# Patient Record
Sex: FEMALE | Race: WHITE | NOT HISPANIC OR LATINO | Employment: FULL TIME | URBAN - METROPOLITAN AREA
[De-identification: names, ages, dates, MRNs, and addresses within clinical notes are randomized per-mention and may not be internally consistent; named-entity substitution may affect disease eponyms.]

---

## 2022-02-26 ENCOUNTER — APPOINTMENT (OUTPATIENT)
Dept: GENERAL RADIOLOGY | Facility: CLINIC | Age: 47
End: 2022-02-26
Attending: PHYSICIAN ASSISTANT
Payer: COMMERCIAL

## 2022-02-26 ENCOUNTER — HOSPITAL ENCOUNTER (EMERGENCY)
Facility: CLINIC | Age: 47
Discharge: HOME | End: 2022-02-26
Attending: PHYSICIAN ASSISTANT | Admitting: PHYSICIAN ASSISTANT
Payer: COMMERCIAL

## 2022-02-26 ENCOUNTER — APPOINTMENT (OUTPATIENT)
Dept: GENERAL RADIOLOGY | Facility: CLINIC | Age: 47
End: 2022-02-26
Attending: EMERGENCY MEDICINE
Payer: COMMERCIAL

## 2022-02-26 VITALS
RESPIRATION RATE: 18 BRPM | HEART RATE: 74 BPM | SYSTOLIC BLOOD PRESSURE: 119 MMHG | TEMPERATURE: 98.1 F | DIASTOLIC BLOOD PRESSURE: 76 MMHG | OXYGEN SATURATION: 98 %

## 2022-02-26 DIAGNOSIS — S52.501A CLOSED FRACTURE OF DISTAL END OF RIGHT RADIUS, UNSPECIFIED FRACTURE MORPHOLOGY, INITIAL ENCOUNTER: ICD-10-CM

## 2022-02-26 PROCEDURE — 96374 THER/PROPH/DIAG INJ IV PUSH: CPT | Mod: 59

## 2022-02-26 PROCEDURE — 25605 CLTX DST RDL FX/EPHYS SEP W/: CPT | Mod: RT

## 2022-02-26 PROCEDURE — 250N000011 HC RX IP 250 OP 636: Performed by: EMERGENCY MEDICINE

## 2022-02-26 PROCEDURE — 96375 TX/PRO/DX INJ NEW DRUG ADDON: CPT | Mod: 59

## 2022-02-26 PROCEDURE — 999N000065 XR WRIST RIGHT 2 VIEW: Mod: RT

## 2022-02-26 PROCEDURE — 250N000013 HC RX MED GY IP 250 OP 250 PS 637: Performed by: PHYSICIAN ASSISTANT

## 2022-02-26 PROCEDURE — 99285 EMERGENCY DEPT VISIT HI MDM: CPT | Mod: 25

## 2022-02-26 PROCEDURE — 73110 X-RAY EXAM OF WRIST: CPT | Mod: RT

## 2022-02-26 PROCEDURE — 73090 X-RAY EXAM OF FOREARM: CPT | Mod: RT

## 2022-02-26 RX ORDER — KETOROLAC TROMETHAMINE 15 MG/ML
15 INJECTION, SOLUTION INTRAMUSCULAR; INTRAVENOUS ONCE
Status: COMPLETED | OUTPATIENT
Start: 2022-02-26 | End: 2022-02-26

## 2022-02-26 RX ORDER — ACETAMINOPHEN 325 MG/1
975 TABLET ORAL ONCE
Status: COMPLETED | OUTPATIENT
Start: 2022-02-26 | End: 2022-02-26

## 2022-02-26 RX ORDER — MORPHINE SULFATE 4 MG/ML
4 INJECTION, SOLUTION INTRAMUSCULAR; INTRAVENOUS
Status: COMPLETED | OUTPATIENT
Start: 2022-02-26 | End: 2022-02-26

## 2022-02-26 RX ORDER — OXYCODONE AND ACETAMINOPHEN 5; 325 MG/1; MG/1
1 TABLET ORAL EVERY 6 HOURS PRN
Qty: 12 TABLET | Refills: 0 | Status: ON HOLD | OUTPATIENT
Start: 2022-02-26 | End: 2022-03-10

## 2022-02-26 RX ORDER — BUPIVACAINE HYDROCHLORIDE 5 MG/ML
INJECTION, SOLUTION PERINEURAL
Status: DISCONTINUED
Start: 2022-02-26 | End: 2022-02-26 | Stop reason: HOSPADM

## 2022-02-26 RX ADMIN — KETOROLAC TROMETHAMINE 15 MG: 15 INJECTION, SOLUTION INTRAMUSCULAR; INTRAVENOUS at 19:03

## 2022-02-26 RX ADMIN — MORPHINE SULFATE 4 MG: 4 INJECTION INTRAVENOUS at 19:01

## 2022-02-26 RX ADMIN — ACETAMINOPHEN 975 MG: 325 TABLET, FILM COATED ORAL at 16:46

## 2022-02-26 ASSESSMENT — ENCOUNTER SYMPTOMS
NUMBNESS: 1
ARTHRALGIAS: 1

## 2022-02-26 NOTE — ED PROVIDER NOTES
History   Chief Complaint:  Arm Pain       HPI   Liset Velez is a left-handed 46 year old female, otherwise healthy, who presents with right arm injury. About an hour prior to her ED arrival, the patient was snowboarding at [facility] when she fell backwards onto her outstretched right hand. Since then, she has developed pain and swelling to her right wrist and forearm. Additionally, she is having some numbness and tingling in all of her right fingers. She has not taken anything for pain at this point. She denies hitting her head or experiencing any loss of consciousness. She was wearing a helmet at the time of injury.     Review of Systems   Musculoskeletal: Positive for arthralgias (right wrist).   Neurological: Positive for numbness.   All other systems reviewed and are negative.      Allergies:  The patient has no known allergies.     Medications:  The patient is currently on no regular medications.    Past Medical History:     The patient has no pertinent medical history.    Past Surgical History:    The patient has no pertinent surgical history.     Family History:    The patient has no known family history.    Social History:  The patient presents to the ED with her . Today was her third time snowboarding.       Physical Exam     Patient Vitals for the past 24 hrs:   BP Temp Temp src Pulse Resp SpO2   02/26/22 1920 -- -- -- 78 -- 97 %   02/26/22 1910 -- -- -- 86 -- 97 %   02/26/22 1612 119/76 98.1  F (36.7  C) Temporal 87 18 98 %       Physical Exam  HEENT: mmm  Eyes: PERRL B/L  Neck: Supple  CV: Peripheral pulses intact and regular  Resp: Speaking in full sentences without any respiratory distress  Ext:  Right upper extremity  Deformity noted to right wrist with associated TTP  Decreased ROM at wrist secondary to pain, decreased ROM of fingers as well  <2 sec cap refill throughout.  Sensation intact throughout all fingers.  Remainder of the skeletal survey is unremarkable  Skin: warm dry well  perfused  Neuro: Alert  Nursing notes and vital signs reviewed.      Emergency Department Course     Imaging:  XR Wrist Right G/E 3 Views   Final Result   IMPRESSION:    Wrist: Comminuted fracture distal radial metaphysis likely extending to the distal articular surface medially and dorsally. There is dorsal displacement of the distal fragment by 1 cm and impaction dorsally resulting in apex volar angulation. Soft tissue    swelling.      Forearm: No additional fracture.      Radius/Ulna XR, PA & LAT, right   Final Result   IMPRESSION:    Wrist: Comminuted fracture distal radial metaphysis likely extending to the distal articular surface medially and dorsally. There is dorsal displacement of the distal fragment by 1 cm and impaction dorsally resulting in apex volar angulation. Soft tissue    swelling.      Forearm: No additional fracture.        Report per radiology    Emergency Department Course:    Reviewed:  I reviewed nursing notes and vitals    Assessments:  1630 I obtained history and examined the patient as noted above.    I rechecked the patient and explained findings.     Consults:  1817 I spoke with my colleague Dr. Guillory who agreed to share service.     Interventions:  1646 Tylenol 975 mg PO    Disposition:  The patient was discharged to home.     Impression & Plan     Medical Decision Making:  Liset Velez is a 46 year old female who presents to the ED for evaluation after a fall while snowboarding.  This was a FOOSH.  She denies hitting her head, no LOC.  No pain to other extremities.  Patient has obvious deformity to right wrist.  See HPI as above for additional details.  Vitals and physical exam as above.  Imaging obtained as above suggestive fracture of distal radial metaphysis with displacement. See results as above. Patient signed out to Dr. Guillory for reduction. Will provide Percocet Rx for home, and she will f/u with orthopedics at home. Patient stable at time of sign out.     Diagnosis:     ICD-10-CM    1. Closed fracture of distal end of right radius, unspecified fracture morphology, initial encounter  S52.501A        Discharge Medications:  New Prescriptions    No medications on file       Scribe Disclosure:  I, Pearl Adame, am serving as a scribe on 2/26/2022 at 4:27 PM to personally document services performed by Lefty Quesada PA-C based on my observations and the provider's statements to me.     This record was created at least in part using electronic voice recognition software, so please excuse any typographical errors.         Lefty Quesada PA-C  02/26/22 1957

## 2022-02-26 NOTE — ED TRIAGE NOTES
Pt arrives with c/o right wrist pain s/p falling backwards while snowboarding. Pt has limited movement in fingers, sensation is intact. ABCs intact.

## 2022-02-26 NOTE — LETTER
February 26, 2022      To Whom It May Concern:      Liset Velez was seen in our Emergency Department today, 02/26/22.  She has a right wrist fracture.  Please excuse her from work for the next 7 days.    Sincerely,        Deyvi Guillory MD

## 2022-02-27 NOTE — DISCHARGE INSTRUCTIONS
Take ibuprofen 600mg every 6 hours for pain. You may also take Tylenol, but be careful to not take more than 3000mg of Tylenol in a day, and the prescribed Percocet contains Tylenol as well.    Use Percocet for breakthrough pain. This is sedating. Do not drive after taking.         Discharge Instructions  Splint Care    You had a splint put on today to help protect your injury and help it heal.  Splints are used to treat things like strains, sprains, large cuts, and fractures (broken bones). Splints are temporary and are designed to allow for swelling.    Be sure your splint is not too tight!  If you splint is too tight, it may cause loss of blood supply.  Signs of your splint being too tight include: your arm or leg hurting a lot more; your fingers or toes getting numb, cold, pale or blue; or your child is crying, fussing or seeming restless.    Generally, every Emergency Department visit should have a follow-up clinic visit with either a primary or a specialty clinic/provider. Please follow-up as instructed by your emergency provider today.  Return to the Emergency Department right away if:  You have increased pain or pressure around the injury.  You have numbness, tingling, or cool, pale, or blue toes or fingers past the injury.  Your child is more fussy than normal, crying a lot, or restless.  Your splint becomes soft, breaks, or is wet.  Your splint begins to smell bad.  Your splint is cutting into your skin.    Home care:  Keep the injured area above the level of your heart while laying or sitting down.  This will help decrease the swelling and the pain.  Keep the splint dry.  Do not put objects down or inside the splint.  If there is an elastic bandage (Ace  wrap) holding the splint on this may be loosened slightly to relieve pressure or pain.  If pain continues return to the Emergency Department right away.  Do not remove your splint by yourself unless told to by your provider.    Follow-up:  Sometimes the  splint put on in the Emergency Department needs to be changed once the swelling has gone down and a more permanent cast needs to be placed.  This is usually done by a bone specialist provider (Orthopedist).  Follow the instructions given to you by your provider today.    If you were given a prescription for medicine here today, be sure to read all of the information (including the package insert) that comes with your prescription.  This will include important information about the medicine, its side effects, and any warnings that you need to know about.  The pharmacist who fills the prescription can provide more information and answer questions you may have about the medicine.  If you have questions or concerns that the pharmacist cannot address, please call or return to the Emergency Department.     Remember that you can always come back to the Emergency Department if you are not able to see your regular provider in the amount of time listed above, if you get any new symptoms, or if there is anything that worries you.

## 2022-02-27 NOTE — ED PROVIDER NOTES
Emergency Department Attending Supervision Note  2/26/2022  6:17 PM      I evaluated this patient in conjunction with Lefty Quesada PA-C , please see primary note for full details      Briefly, the patient presented with right wrist pain onset earlier today at after fall while snowboarding.  Patient denies loss of consciousness, chest pain, shortness of breath, abdominal pain.  She does report associated numbness and tingling to her fingers      On my exam, nontoxic appearance, normal respiratory effort, obvious deformity to right wrist with distal CMS intact although range of motion limited due to deformity and pain    Results:  XR Wrist Right G/E 3 Views   Final Result   IMPRESSION:    Wrist: Comminuted fracture distal radial metaphysis likely extending to the distal articular surface medially and dorsally. There is dorsal displacement of the distal fragment by 1 cm and impaction dorsally resulting in apex volar angulation. Soft tissue    swelling.      Forearm: No additional fracture.      Radius/Ulna XR, PA & LAT, right   Final Result   IMPRESSION:    Wrist: Comminuted fracture distal radial metaphysis likely extending to the distal articular surface medially and dorsally. There is dorsal displacement of the distal fragment by 1 cm and impaction dorsally resulting in apex volar angulation. Soft tissue    swelling.      Forearm: No additional fracture.      XR Wrist Right 2 Views    (Results Pending)     [facility]    -Fracture    Date/Time: 2/26/2022 8:44 PM  Performed by: Deyvi Guillory MD  Authorized by: Deyvi Guillory MD     Risks, benefits and alternatives discussed.      INJURY      Injury location:  Wrist    Wrist injury location:  R wrist    Wrist fracture type comment:  Colle's fracture of distal radius    PRE PROCEDURE ASSESSMENT      Neurological function: normal      Distal perfusion: normal      Range of motion: reduced      ANESTHESIA (see MAR for exact  dosages)      Anesthesia method:  Local infiltration (Hematoma block using 10 mL)    Local anesthetic:  Bupivacaine 0.5% w/o epi    PROCEDURE DETAILS:     Manipulation performed: yes      Skin traction used: yes (finger traps)      Skeletal traction used: no      Pin inserted: no      Reduction successful: yes      X-ray confirmed reduction: yes      Immobilization:  Splint    Splint type:  Sugar tong    Supplies used:  Ortho-Glass    POST PROCEDURE ASSESSMENT      Neurological function: normal      Distal perfusion: normal      Range of motion: improved        PROCEDURE    Patient Tolerance:  Patient tolerated the procedure well with no immediate complications      ED course:    46 year old female presenting w/ R wrist pain and obvious deformity.  X-rays reveal a fracture as described above.  Reduction performed as noted above with finger traps after hematoma block was placed.  X-rays revealed improved alignment.   At this time I feel the patient is safe for discharge.  RICE instructions given for home Recommendations given regarding follow up with Orthopedics and return to the emergency department as needed for new or worsening symptoms.  Patient counseled on all results, diagnosis and disposition.  They are understanding and agreeable to plan. Patient discharged in stable condition.      Diagnosis    ICD-10-CM    1. Closed fracture of distal end of right radius, unspecified fracture morphology, initial encounter  S52.501A          MD Kahlil Clements Christopher E, MD  02/26/22 1584

## 2022-03-03 ENCOUNTER — OFFICE VISIT (OUTPATIENT)
Dept: ORTHOPEDICS | Facility: CLINIC | Age: 47
End: 2022-03-03
Payer: COMMERCIAL

## 2022-03-03 ENCOUNTER — PREP FOR PROCEDURE (OUTPATIENT)
Dept: ORTHOPEDICS | Facility: CLINIC | Age: 47
End: 2022-03-03

## 2022-03-03 ENCOUNTER — TELEPHONE (OUTPATIENT)
Dept: ORTHOPEDICS | Facility: CLINIC | Age: 47
End: 2022-03-03

## 2022-03-03 VITALS — WEIGHT: 132 LBS | DIASTOLIC BLOOD PRESSURE: 74 MMHG | SYSTOLIC BLOOD PRESSURE: 126 MMHG

## 2022-03-03 DIAGNOSIS — Z11.59 ENCOUNTER FOR SCREENING FOR OTHER VIRAL DISEASES: Primary | ICD-10-CM

## 2022-03-03 DIAGNOSIS — S52.531A CLOSED COLLES' FRACTURE OF RIGHT RADIUS, INITIAL ENCOUNTER: Primary | ICD-10-CM

## 2022-03-03 PROCEDURE — 99203 OFFICE O/P NEW LOW 30 MIN: CPT | Performed by: ORTHOPAEDIC SURGERY

## 2022-03-03 RX ORDER — VARENICLINE TARTRATE 1 MG/1
1 TABLET, FILM COATED ORAL
COMMUNITY
Start: 2022-02-21

## 2022-03-03 NOTE — PROGRESS NOTES
HISTORY OF PRESENT ILLNESS:    Liset Velez is a 46 year old female who is seen Emergency Room follow up for right distal radius fracture.  Injury occurred on 2/26/22 after a fall while snowboarding. Left hand dominant, and works as nurse.     Present symptoms: right dorsal wrist pain. She reports discomfort at the 4th MCP finger. She reports that   Current pain level: 5/10  Treatments tried to this point: closed reduction in ED, splint application, Percocet at nighttime, Tylenol.  Patient reports she was placed in traction yesterday, and a new reverse sugar tong splint, patient did not tolerate and removed splint last night and reapplied splint that was placed in ED on 2/26/22.   So far, she was initially seen at the emergency room at [facility] on February 26, 2022.  She was then seen on February 28 of 2022 at [facility] and then again yesterday March 2, 2022.  She is right-hand dominant.    Orthopedic PMH: none      No past medical history on file.  History of uterine bleeding      No past surgical history on file.  None significant    No family history on file.  Not contributory    Social History     Socioeconomic History     Marital status: Single     Spouse name: Not on file     Number of children: Not on file     Years of education: Not on file     Highest education level: Not on file   Occupational History     Not on file   Tobacco Use     Smoking status: Not on file     Smokeless tobacco: Not on file   Substance and Sexual Activity     Alcohol use: Not on file     Drug use: Not on file     Sexual activity: Not on file   Other Topics Concern     Not on file   Social History Narrative     Not on file     Social Determinants of Health     Financial Resource Strain: Not on file   Food Insecurity: Not on file   Transportation Needs: Not on file   Physical Activity: Not on file   Stress: Not on file   Social Connections: Not on file   Intimate Partner Violence: Not on file   Housing Stability: Not  on file       Current Outpatient Medications   Medication Sig Dispense Refill     oxyCODONE-acetaminophen (PERCOCET) 5-325 MG tablet Take 1 tablet by mouth every 6 hours as needed for severe pain 12 tablet 0       No Known Allergies    REVIEW OF SYSTEMS:  CONSTITUTIONAL:  NEGATIVE for fever, chills, change in weight  INTEGUMENTARY/SKIN:  NEGATIVE for worrisome rashes, moles or lesions  EYES:  NEGATIVE for vision changes or irritation  ENT/MOUTH:  NEGATIVE for ear, mouth and throat problems  RESP:  NEGATIVE for significant cough or SOB  BREAST:  NEGATIVE for masses, tenderness or discharge  CV:  NEGATIVE for chest pain, palpitations or peripheral edema  GI:  NEGATIVE for nausea, abdominal pain, heartburn, or change in bowel habits  :  Negative   MUSCULOSKELETAL:  See HPI above  NEURO:  NEGATIVE for weakness, dizziness or paresthesias  ENDOCRINE:  NEGATIVE for temperature intolerance, skin/hair changes  HEME/ALLERGY/IMMUNE:  NEGATIVE for bleeding problems  PSYCHIATRIC:  NEGATIVE for changes in mood or affect      PHYSICAL EXAM:  LMP 02/01/2022 (Approximate)   There is no height or weight on file to calculate BMI.   GENERAL APPEARANCE: healthy, alert and no distress   HEENT: No apparent thyroid megaly. Clear sclera with normal ocular movement  RESPIRATORY: No labored breathing  SKIN: no suspicious lesions or rashes  NEURO: Normal strength and tone, mentation intact and speech normal  VASCULAR: Good pulses, and capillary refill   LYMPH: no lymphadenopathy   PSYCH:  mentation appears normal and affect normal/bright    MUSCULOSKELETAL:  Not in acute distress  Mild diffuse swelling of the fingertips exposed outside sugar tong splint  Sensation is grossly intact       ASSESSMENT:  Distal radius fracture with significant displacement and angulation  There appears to be a nondisplaced intra-articular component, right    PLAN:  There were 2 sets of the x-rays of 1 at the time of presentation and after sugar tong splint  application.  There was noticeable improvement of the alignment.  Even with improvement, she does have significant dorsal displacement and tilting.  With her young age of 46, in my opinion, she would be best served with more anatomic restoration of the alignment.    Treatment options are closed duction and casting versus open reduction internal fixation of distal radius were informed.    She understands our discussion and wants to go ahead with surgical intervention.    The nature of the surgery, type of plates that we use for the surgery and potential risks of infection, loss of range of motion, neurovascular compromise and stiffness etc. were thoroughly informed.    This will be done as an outpatient surgery under general anesthesia.        Imaging Interpretation:   None taken today      Jose L Castorena MD  Department of Orthopedic Surgery        Disclaimer: This note consists of symbols derived from keyboarding, dictation and/or voice recognition software. As a result, there may be errors in the script that have gone undetected. Please consider this when interpreting information found in this chart.

## 2022-03-03 NOTE — TELEPHONE ENCOUNTER
Scheduled surgery    No covid test needed, patient tested positive on 1/7/22    Type of surgery: ORIF right wrist  Location of surgery: [facility]  Date and time of surgery: 3/10/22  Surgeon: Kell  Pre-Op Appt Date: patient to schedule  Post-Op Appt Date: 3/21/22   Packet sent out: Yes  Pre-cert/Authorization completed:  No  Date: 3.3.22      Flaco Garza, Surgery Scheduler

## 2022-03-03 NOTE — LETTER
3/3/2022         RE: Liset Velez  [address] [facility] [address]        Dear Colleague,    Thank you for referring your patient, Liset Velez, to the [facility] [place]. Please see a copy of my visit note below.    HISTORY OF PRESENT ILLNESS:    Liset Velez is a 46 year old female who is seen Emergency Room follow up for right distal radius fracture.  Injury occurred on 2/26/22 after a fall while snowboarding. Left hand dominant, and works as nurse.     Present symptoms: right dorsal wrist pain. She reports discomfort at the 4th MCP finger. She reports that   Current pain level: 5/10  Treatments tried to this point: closed reduction in ED, splint application, Percocet at nighttime, Tylenol.  Patient reports she was placed in traction yesterday, and a new reverse sugar tong splint, patient did not tolerate and removed splint last night and reapplied splint that was placed in ED on 2/26/22.   So far, she was initially seen at the emergency room at [facility] on February 26, 2022.  She was then seen on February 28 of 2022 at [facility] and then again yesterday March 2, 2022.  She is right-hand dominant.    Orthopedic PMH: none      No past medical history on file.  History of uterine bleeding      No past surgical history on file.  None significant    No family history on file.  Not contributory    Social History     Socioeconomic History     Marital status: Single     Spouse name: Not on file     Number of children: Not on file     Years of education: Not on file     Highest education level: Not on file   Occupational History     Not on file   Tobacco Use     Smoking status: Not on file     Smokeless tobacco: Not on file   Substance and Sexual Activity     Alcohol use: Not on file     Drug use: Not on file     Sexual activity: Not on file   Other Topics Concern     Not on file   Social History Narrative     Not on file     Social Determinants of Health      Financial Resource Strain: Not on file   Food Insecurity: Not on file   Transportation Needs: Not on file   Physical Activity: Not on file   Stress: Not on file   Social Connections: Not on file   Intimate Partner Violence: Not on file   Housing Stability: Not on file       Current Outpatient Medications   Medication Sig Dispense Refill     oxyCODONE-acetaminophen (PERCOCET) 5-325 MG tablet Take 1 tablet by mouth every 6 hours as needed for severe pain 12 tablet 0       No Known Allergies    REVIEW OF SYSTEMS:  CONSTITUTIONAL:  NEGATIVE for fever, chills, change in weight  INTEGUMENTARY/SKIN:  NEGATIVE for worrisome rashes, moles or lesions  EYES:  NEGATIVE for vision changes or irritation  ENT/MOUTH:  NEGATIVE for ear, mouth and throat problems  RESP:  NEGATIVE for significant cough or SOB  BREAST:  NEGATIVE for masses, tenderness or discharge  CV:  NEGATIVE for chest pain, palpitations or peripheral edema  GI:  NEGATIVE for nausea, abdominal pain, heartburn, or change in bowel habits  :  Negative   MUSCULOSKELETAL:  See HPI above  NEURO:  NEGATIVE for weakness, dizziness or paresthesias  ENDOCRINE:  NEGATIVE for temperature intolerance, skin/hair changes  HEME/ALLERGY/IMMUNE:  NEGATIVE for bleeding problems  PSYCHIATRIC:  NEGATIVE for changes in mood or affect      PHYSICAL EXAM:  LMP 02/01/2022 (Approximate)   There is no height or weight on file to calculate BMI.   GENERAL APPEARANCE: healthy, alert and no distress   HEENT: No apparent thyroid megaly. Clear sclera with normal ocular movement  RESPIRATORY: No labored breathing  SKIN: no suspicious lesions or rashes  NEURO: Normal strength and tone, mentation intact and speech normal  VASCULAR: Good pulses, and capillary refill   LYMPH: no lymphadenopathy   PSYCH:  mentation appears normal and affect normal/bright    MUSCULOSKELETAL:  Not in acute distress  Mild diffuse swelling of the fingertips exposed outside sugar tong splint  Sensation is grossly  intact       ASSESSMENT:  Distal radius fracture with significant displacement and angulation  There appears to be a nondisplaced intra-articular component, right    PLAN:  There were 2 sets of the x-rays of 1 at the time of presentation and after sugar tong splint application.  There was noticeable improvement of the alignment.  Even with improvement, she does have significant dorsal displacement and tilting.  With her young age of 46, in my opinion, she would be best served with more anatomic restoration of the alignment.    Treatment options are closed duction and casting versus open reduction internal fixation of distal radius were informed.    She understands our discussion and wants to go ahead with surgical intervention.    The nature of the surgery, type of plates that we use for the surgery and potential risks of infection, loss of range of motion, neurovascular compromise and stiffness etc. were thoroughly informed.    This will be done as an outpatient surgery under general anesthesia.        Imaging Interpretation:   None taken today      Jose L Castorena MD  Department of Orthopedic Surgery        Disclaimer: This note consists of symbols derived from keyboarding, dictation and/or voice recognition software. As a result, there may be errors in the script that have gone undetected. Please consider this when interpreting information found in this chart.        Again, thank you for allowing me to participate in the care of your patient.        Sincerely,        Jose L Castorena MD

## 2022-03-03 NOTE — PATIENT INSTRUCTIONS
Schedule RIGHT WRIST ORIF surgery.   Kam Sam Scheduler will contact you to assist with scheduling surgery.   You can contact her directly at [phone].   Prior to your scheduled surgery we advise scheduling with your dentist to obtain clearance for surgery, and to complete any recommended dental work prior.     FORMS:   If you are needing any forms completed relating to your upcoming procedure, please send them to our office with a completed Release of Information.   Forms will be completed AFTER your procedure. A letter can be sent to your employer prior to surgery to inform them of your anticipated time off.    Please notify our staff if you would like a letter to do so.   Forms can be faxed directly to our clinic at [phone].     DO NOT BRING FORMS ON THE DATE OF SURGERY.     MEDICATION REFILL:   Please allow 3 business days for completion of medication refills for any surgery related prescription.   Medication refills submitted on Friday, may not be addressed until the following Monday.  You may request a refill via [employer], or by calling our Nurse Triage at [phone], option 3.         Call my office with any questions or concerns, [phone].

## 2022-03-07 ENCOUNTER — TRANSFERRED RECORDS (OUTPATIENT)
Dept: HEALTH INFORMATION MANAGEMENT | Facility: CLINIC | Age: 47
End: 2022-03-07
Payer: COMMERCIAL

## 2022-03-07 RX ORDER — NAPROXEN 500 MG/1
TABLET ORAL
COMMUNITY
Start: 2022-01-20

## 2022-03-07 RX ORDER — OXYCODONE HYDROCHLORIDE 5 MG/1
5 TABLET ORAL
Status: ON HOLD | COMMUNITY
Start: 2022-03-07 | End: 2022-03-10

## 2022-03-10 ENCOUNTER — HOSPITAL ENCOUNTER (OUTPATIENT)
Facility: CLINIC | Age: 47
Discharge: HOME | End: 2022-03-10
Attending: ORTHOPAEDIC SURGERY | Admitting: ORTHOPAEDIC SURGERY
Payer: COMMERCIAL

## 2022-03-10 ENCOUNTER — APPOINTMENT (OUTPATIENT)
Dept: GENERAL RADIOLOGY | Facility: CLINIC | Age: 47
End: 2022-03-10
Attending: ORTHOPAEDIC SURGERY
Payer: COMMERCIAL

## 2022-03-10 ENCOUNTER — ANESTHESIA (OUTPATIENT)
Dept: SURGERY | Facility: CLINIC | Age: 47
End: 2022-03-10
Payer: COMMERCIAL

## 2022-03-10 ENCOUNTER — ANESTHESIA EVENT (OUTPATIENT)
Dept: SURGERY | Facility: CLINIC | Age: 47
End: 2022-03-10
Payer: COMMERCIAL

## 2022-03-10 VITALS
DIASTOLIC BLOOD PRESSURE: 82 MMHG | RESPIRATION RATE: 18 BRPM | SYSTOLIC BLOOD PRESSURE: 119 MMHG | WEIGHT: 134 LBS | TEMPERATURE: 97.1 F | HEIGHT: 65 IN | OXYGEN SATURATION: 100 % | HEART RATE: 76 BPM | BODY MASS INDEX: 22.33 KG/M2

## 2022-03-10 DIAGNOSIS — T40.2X5A CONSTIPATION DUE TO OPIOID THERAPY: ICD-10-CM

## 2022-03-10 DIAGNOSIS — G89.18 POST-OPERATIVE PAIN: Primary | ICD-10-CM

## 2022-03-10 DIAGNOSIS — K59.03 CONSTIPATION DUE TO OPIOID THERAPY: ICD-10-CM

## 2022-03-10 PROCEDURE — 370N000017 HC ANESTHESIA TECHNICAL FEE, PER MIN: Performed by: ORTHOPAEDIC SURGERY

## 2022-03-10 PROCEDURE — 360N000083 HC SURGERY LEVEL 3 W/ FLUORO, PER MIN: Performed by: ORTHOPAEDIC SURGERY

## 2022-03-10 PROCEDURE — 250N000011 HC RX IP 250 OP 636: Performed by: ORTHOPAEDIC SURGERY

## 2022-03-10 PROCEDURE — 250N000011 HC RX IP 250 OP 636: Performed by: NURSE ANESTHETIST, CERTIFIED REGISTERED

## 2022-03-10 PROCEDURE — 710N000012 HC RECOVERY PHASE 2, PER MINUTE: Performed by: ORTHOPAEDIC SURGERY

## 2022-03-10 PROCEDURE — 250N000009 HC RX 250: Performed by: ORTHOPAEDIC SURGERY

## 2022-03-10 PROCEDURE — 258N000003 HC RX IP 258 OP 636: Performed by: ANESTHESIOLOGY

## 2022-03-10 PROCEDURE — 272N000001 HC OR GENERAL SUPPLY STERILE: Performed by: ORTHOPAEDIC SURGERY

## 2022-03-10 PROCEDURE — 250N000009 HC RX 250: Performed by: ANESTHESIOLOGY

## 2022-03-10 PROCEDURE — 250N000009 HC RX 250: Performed by: NURSE ANESTHETIST, CERTIFIED REGISTERED

## 2022-03-10 PROCEDURE — 250N000011 HC RX IP 250 OP 636: Performed by: ANESTHESIOLOGY

## 2022-03-10 PROCEDURE — 999N000141 HC STATISTIC PRE-PROCEDURE NURSING ASSESSMENT: Performed by: ORTHOPAEDIC SURGERY

## 2022-03-10 PROCEDURE — 250N000013 HC RX MED GY IP 250 OP 250 PS 637: Performed by: ANESTHESIOLOGY

## 2022-03-10 PROCEDURE — C1713 ANCHOR/SCREW BN/BN,TIS/BN: HCPCS | Performed by: ORTHOPAEDIC SURGERY

## 2022-03-10 PROCEDURE — 250N000013 HC RX MED GY IP 250 OP 250 PS 637: Performed by: ORTHOPAEDIC SURGERY

## 2022-03-10 PROCEDURE — 25607 OPTX DST RD XARTC FX/EPI SEP: CPT | Mod: RT | Performed by: ORTHOPAEDIC SURGERY

## 2022-03-10 PROCEDURE — 999N000179 XR SURGERY CARM FLUORO LESS THAN 5 MIN W STILLS: Mod: TC

## 2022-03-10 DEVICE — IMP SCR SYN CORTEX T8 STARDRIVE 2.7X12MM SELF TAP 202.872: Type: IMPLANTABLE DEVICE | Site: WRIST | Status: FUNCTIONAL

## 2022-03-10 DEVICE — IMP SCR SYN CAN T8 STARDRIVE 2.4X20MM VA-LCP ST 02.210.120: Type: IMPLANTABLE DEVICE | Site: WRIST | Status: FUNCTIONAL

## 2022-03-10 DEVICE — IMP SCR SYN CORTEX T8 STARDRIVE 2.7X10MM SELF TAP 202.870: Type: IMPLANTABLE DEVICE | Site: WRIST | Status: FUNCTIONAL

## 2022-03-10 DEVICE — IMPLANTABLE DEVICE: Type: IMPLANTABLE DEVICE | Site: WRIST | Status: FUNCTIONAL

## 2022-03-10 DEVICE — IMP SCR SYN CORTEX T8 STARDRIVE 2.7X14MM SELF TAP 202.874: Type: IMPLANTABLE DEVICE | Site: WRIST | Status: FUNCTIONAL

## 2022-03-10 DEVICE — IMP SCR SYN LCP CORTEX 2.4X18MM SELF TAP 201.768: Type: IMPLANTABLE DEVICE | Site: WRIST | Status: FUNCTIONAL

## 2022-03-10 RX ORDER — BUPIVACAINE HYDROCHLORIDE AND EPINEPHRINE 5; 5 MG/ML; UG/ML
INJECTION, SOLUTION PERINEURAL PRN
Status: DISCONTINUED | OUTPATIENT
Start: 2022-03-10 | End: 2022-03-10

## 2022-03-10 RX ORDER — NALOXONE HYDROCHLORIDE 0.4 MG/ML
0.2 INJECTION, SOLUTION INTRAMUSCULAR; INTRAVENOUS; SUBCUTANEOUS
Status: DISCONTINUED | OUTPATIENT
Start: 2022-03-10 | End: 2022-03-10 | Stop reason: HOSPADM

## 2022-03-10 RX ORDER — ACETAMINOPHEN 325 MG/1
975 TABLET ORAL ONCE
Status: COMPLETED | OUTPATIENT
Start: 2022-03-10 | End: 2022-03-10

## 2022-03-10 RX ORDER — CELECOXIB 200 MG/1
400 CAPSULE ORAL ONCE
Status: DISCONTINUED | OUTPATIENT
Start: 2022-03-10 | End: 2022-03-10

## 2022-03-10 RX ORDER — LIDOCAINE 40 MG/G
CREAM TOPICAL
Status: DISCONTINUED | OUTPATIENT
Start: 2022-03-10 | End: 2022-03-10 | Stop reason: HOSPADM

## 2022-03-10 RX ORDER — NALOXONE HYDROCHLORIDE 0.4 MG/ML
0.4 INJECTION, SOLUTION INTRAMUSCULAR; INTRAVENOUS; SUBCUTANEOUS
Status: DISCONTINUED | OUTPATIENT
Start: 2022-03-10 | End: 2022-03-10 | Stop reason: HOSPADM

## 2022-03-10 RX ORDER — FENTANYL CITRATE 50 UG/ML
25 INJECTION, SOLUTION INTRAMUSCULAR; INTRAVENOUS
Status: DISCONTINUED | OUTPATIENT
Start: 2022-03-10 | End: 2022-03-10 | Stop reason: HOSPADM

## 2022-03-10 RX ORDER — LORAZEPAM 2 MG/ML
.5-1 INJECTION INTRAMUSCULAR
Status: DISCONTINUED | OUTPATIENT
Start: 2022-03-10 | End: 2022-03-10 | Stop reason: HOSPADM

## 2022-03-10 RX ORDER — GLYCOPYRROLATE 0.2 MG/ML
INJECTION, SOLUTION INTRAMUSCULAR; INTRAVENOUS PRN
Status: DISCONTINUED | OUTPATIENT
Start: 2022-03-10 | End: 2022-03-10

## 2022-03-10 RX ORDER — CELECOXIB 200 MG/1
400 CAPSULE ORAL ONCE
Status: COMPLETED | OUTPATIENT
Start: 2022-03-10 | End: 2022-03-10

## 2022-03-10 RX ORDER — ACETAMINOPHEN 325 MG/1
975 TABLET ORAL ONCE
Status: DISCONTINUED | OUTPATIENT
Start: 2022-03-10 | End: 2022-03-10

## 2022-03-10 RX ORDER — SODIUM CHLORIDE, SODIUM LACTATE, POTASSIUM CHLORIDE, CALCIUM CHLORIDE 600; 310; 30; 20 MG/100ML; MG/100ML; MG/100ML; MG/100ML
INJECTION, SOLUTION INTRAVENOUS CONTINUOUS
Status: DISCONTINUED | OUTPATIENT
Start: 2022-03-10 | End: 2022-03-10 | Stop reason: HOSPADM

## 2022-03-10 RX ORDER — PROPOFOL 10 MG/ML
INJECTION, EMULSION INTRAVENOUS CONTINUOUS PRN
Status: DISCONTINUED | OUTPATIENT
Start: 2022-03-10 | End: 2022-03-10

## 2022-03-10 RX ORDER — LABETALOL HYDROCHLORIDE 5 MG/ML
10 INJECTION, SOLUTION INTRAVENOUS
Status: DISCONTINUED | OUTPATIENT
Start: 2022-03-10 | End: 2022-03-10 | Stop reason: HOSPADM

## 2022-03-10 RX ORDER — LIDOCAINE HYDROCHLORIDE 10 MG/ML
INJECTION, SOLUTION INFILTRATION; PERINEURAL PRN
Status: DISCONTINUED | OUTPATIENT
Start: 2022-03-10 | End: 2022-03-10

## 2022-03-10 RX ORDER — ONDANSETRON 2 MG/ML
4 INJECTION INTRAMUSCULAR; INTRAVENOUS EVERY 30 MIN PRN
Status: DISCONTINUED | OUTPATIENT
Start: 2022-03-10 | End: 2022-03-10 | Stop reason: HOSPADM

## 2022-03-10 RX ORDER — ONDANSETRON 4 MG/1
4 TABLET, ORALLY DISINTEGRATING ORAL EVERY 30 MIN PRN
Status: DISCONTINUED | OUTPATIENT
Start: 2022-03-10 | End: 2022-03-10 | Stop reason: HOSPADM

## 2022-03-10 RX ORDER — OXYCODONE HYDROCHLORIDE 5 MG/1
5 TABLET ORAL EVERY 4 HOURS PRN
Qty: 20 TABLET | Refills: 0 | Status: SHIPPED | OUTPATIENT
Start: 2022-03-10

## 2022-03-10 RX ORDER — AMOXICILLIN 250 MG
1 CAPSULE ORAL 2 TIMES DAILY PRN
Qty: 20 TABLET | Refills: 0 | Status: SHIPPED | OUTPATIENT
Start: 2022-03-10

## 2022-03-10 RX ORDER — LIDOCAINE HYDROCHLORIDE AND EPINEPHRINE 10; 10 MG/ML; UG/ML
INJECTION, SOLUTION INFILTRATION; PERINEURAL PRN
Status: DISCONTINUED | OUTPATIENT
Start: 2022-03-10 | End: 2022-03-10 | Stop reason: HOSPADM

## 2022-03-10 RX ORDER — CEFAZOLIN SODIUM/WATER 2 G/20 ML
2 SYRINGE (ML) INTRAVENOUS SEE ADMIN INSTRUCTIONS
Status: DISCONTINUED | OUTPATIENT
Start: 2022-03-10 | End: 2022-03-10 | Stop reason: HOSPADM

## 2022-03-10 RX ORDER — HYDROMORPHONE HYDROCHLORIDE 1 MG/ML
0.5 INJECTION, SOLUTION INTRAMUSCULAR; INTRAVENOUS; SUBCUTANEOUS EVERY 5 MIN PRN
Status: DISCONTINUED | OUTPATIENT
Start: 2022-03-10 | End: 2022-03-10 | Stop reason: HOSPADM

## 2022-03-10 RX ORDER — TRANEXAMIC ACID 650 MG/1
1950 TABLET ORAL ONCE
Status: COMPLETED | OUTPATIENT
Start: 2022-03-10 | End: 2022-03-10

## 2022-03-10 RX ORDER — MAGNESIUM HYDROXIDE 1200 MG/15ML
LIQUID ORAL PRN
Status: DISCONTINUED | OUTPATIENT
Start: 2022-03-10 | End: 2022-03-10 | Stop reason: HOSPADM

## 2022-03-10 RX ORDER — MEPERIDINE HYDROCHLORIDE 25 MG/ML
12.5 INJECTION INTRAMUSCULAR; INTRAVENOUS; SUBCUTANEOUS
Status: DISCONTINUED | OUTPATIENT
Start: 2022-03-10 | End: 2022-03-10 | Stop reason: HOSPADM

## 2022-03-10 RX ORDER — OXYCODONE HYDROCHLORIDE 5 MG/1
5 TABLET ORAL EVERY 4 HOURS PRN
Status: DISCONTINUED | OUTPATIENT
Start: 2022-03-10 | End: 2022-03-10 | Stop reason: HOSPADM

## 2022-03-10 RX ORDER — KETAMINE HYDROCHLORIDE 10 MG/ML
INJECTION INTRAMUSCULAR; INTRAVENOUS PRN
Status: DISCONTINUED | OUTPATIENT
Start: 2022-03-10 | End: 2022-03-10

## 2022-03-10 RX ORDER — ALBUTEROL SULFATE 0.83 MG/ML
2.5 SOLUTION RESPIRATORY (INHALATION) EVERY 4 HOURS PRN
Status: DISCONTINUED | OUTPATIENT
Start: 2022-03-10 | End: 2022-03-10 | Stop reason: HOSPADM

## 2022-03-10 RX ORDER — HYDRALAZINE HYDROCHLORIDE 20 MG/ML
5-10 INJECTION INTRAMUSCULAR; INTRAVENOUS EVERY 10 MIN PRN
Status: DISCONTINUED | OUTPATIENT
Start: 2022-03-10 | End: 2022-03-10 | Stop reason: HOSPADM

## 2022-03-10 RX ORDER — FENTANYL CITRATE 50 UG/ML
INJECTION, SOLUTION INTRAMUSCULAR; INTRAVENOUS PRN
Status: DISCONTINUED | OUTPATIENT
Start: 2022-03-10 | End: 2022-03-10

## 2022-03-10 RX ORDER — DEXAMETHASONE SODIUM PHOSPHATE 4 MG/ML
INJECTION, SOLUTION INTRA-ARTICULAR; INTRALESIONAL; INTRAMUSCULAR; INTRAVENOUS; SOFT TISSUE PRN
Status: DISCONTINUED | OUTPATIENT
Start: 2022-03-10 | End: 2022-03-10

## 2022-03-10 RX ORDER — CEFAZOLIN SODIUM/WATER 2 G/20 ML
2 SYRINGE (ML) INTRAVENOUS
Status: COMPLETED | OUTPATIENT
Start: 2022-03-10 | End: 2022-03-10

## 2022-03-10 RX ORDER — FENTANYL CITRATE 50 UG/ML
50 INJECTION, SOLUTION INTRAMUSCULAR; INTRAVENOUS EVERY 5 MIN PRN
Status: DISCONTINUED | OUTPATIENT
Start: 2022-03-10 | End: 2022-03-10 | Stop reason: HOSPADM

## 2022-03-10 RX ADMIN — LIDOCAINE HYDROCHLORIDE 50 MG: 10 INJECTION, SOLUTION INFILTRATION; PERINEURAL at 11:21

## 2022-03-10 RX ADMIN — SODIUM CHLORIDE, POTASSIUM CHLORIDE, SODIUM LACTATE AND CALCIUM CHLORIDE: 600; 310; 30; 20 INJECTION, SOLUTION INTRAVENOUS at 09:53

## 2022-03-10 RX ADMIN — DEXAMETHASONE SODIUM PHOSPHATE 4 MG: 4 INJECTION, SOLUTION INTRA-ARTICULAR; INTRALESIONAL; INTRAMUSCULAR; INTRAVENOUS; SOFT TISSUE at 11:21

## 2022-03-10 RX ADMIN — GLYCOPYRROLATE 0.2 MCG: 0.2 INJECTION, SOLUTION INTRAMUSCULAR; INTRAVENOUS at 11:23

## 2022-03-10 RX ADMIN — BUPIVACAINE HYDROCHLORIDE AND EPINEPHRINE BITARTRATE 20 ML: 5; .005 INJECTION, SOLUTION EPIDURAL; INTRACAUDAL; PERINEURAL at 10:37

## 2022-03-10 RX ADMIN — ACETAMINOPHEN 975 MG: 325 TABLET, FILM COATED ORAL at 10:24

## 2022-03-10 RX ADMIN — Medication 5 MG: at 11:26

## 2022-03-10 RX ADMIN — MIDAZOLAM 1 MG: 1 INJECTION INTRAMUSCULAR; INTRAVENOUS at 11:30

## 2022-03-10 RX ADMIN — Medication 2 G: at 11:12

## 2022-03-10 RX ADMIN — FENTANYL CITRATE 50 MCG: 50 INJECTION, SOLUTION INTRAMUSCULAR; INTRAVENOUS at 11:21

## 2022-03-10 RX ADMIN — MIDAZOLAM 0.5 MG: 1 INJECTION INTRAMUSCULAR; INTRAVENOUS at 11:45

## 2022-03-10 RX ADMIN — CELECOXIB 400 MG: 200 CAPSULE ORAL at 09:35

## 2022-03-10 RX ADMIN — MIDAZOLAM 2 MG: 1 INJECTION INTRAMUSCULAR; INTRAVENOUS at 10:29

## 2022-03-10 RX ADMIN — MIDAZOLAM 0.5 MG: 1 INJECTION INTRAMUSCULAR; INTRAVENOUS at 11:50

## 2022-03-10 RX ADMIN — Medication 15 MG: at 11:25

## 2022-03-10 RX ADMIN — TRANEXAMIC ACID 1950 MG: 650 TABLET ORAL at 09:35

## 2022-03-10 RX ADMIN — PROPOFOL 150 MCG/KG/MIN: 10 INJECTION, EMULSION INTRAVENOUS at 11:21

## 2022-03-10 NOTE — ANESTHESIA POSTPROCEDURE EVALUATION
Patient: Liset Velez    Procedure: Procedure(s):  Open reduction internal fixation right wrist fracture       Anesthesia Type:  MAC, Peripheral Nerve Block    Note:  Disposition: Outpatient   Postop Pain Control: Uneventful            Sign Out: Well controlled pain   PONV: No   Neuro/Psych: Uneventful            Sign Out: Acceptable/Baseline neuro status   Airway/Respiratory: Uneventful            Sign Out: Acceptable/Baseline resp. status   CV/Hemodynamics: Uneventful            Sign Out: Acceptable CV status   Other NRE: NONE   DID A NON-ROUTINE EVENT OCCUR? No           Last vitals:  Vitals Value Taken Time   /79 03/10/22 1250   Temp 97  F (36.1  C) 03/10/22 1237   Pulse 93 03/10/22 1250   Resp 16 03/10/22 1250   SpO2 96 % 03/10/22 1250       Electronically Signed By: Chito Sagastume MD  March 10, 2022  1:00 PM

## 2022-03-10 NOTE — ANESTHESIA PREPROCEDURE EVALUATION
Anesthesia Pre-Procedure Evaluation    Patient: Liset Velez   MRN: [id number] : 1975        Procedure : Procedure(s):  Open reduction internal fixation right wrist fracture          Past Medical History:   Diagnosis Date     Gastroesophageal reflux disease       Past Surgical History:   Procedure Laterality Date     ORTHOPEDIC SURGERY      Tennis elbow surgery left      ORTHOPEDIC SURGERY       Left knee arthroscopic surgery     ORTHOPEDIC SURGERY       Right knee arthroscopic surgery      No Known Allergies   Social History     Tobacco Use     Smoking status: Light Tobacco Smoker     Types: Cigarettes     Smokeless tobacco: Never Used   Substance Use Topics     Alcohol use: Yes     Comment: social occasional      Wt Readings from Last 1 Encounters:   03/10/22 60.8 kg (134 lb)        Anesthesia Evaluation   Pt has had prior anesthetic. Type: General.    No history of anesthetic complications       ROS/MED HX  ENT/Pulmonary:  - neg pulmonary ROS     Neurologic:  - neg neurologic ROS     Cardiovascular:  - neg cardiovascular ROS     METS/Exercise Tolerance:     Hematologic:  - neg hematologic  ROS     Musculoskeletal:   (+) fracture, Fracture location: RUE,     GI/Hepatic:     (+) GERD, Asymptomatic on medication,     Renal/Genitourinary:  - neg Renal ROS     Endo:  - neg endo ROS     Psychiatric/Substance Use:  - neg psychiatric ROS     Infectious Disease:  - neg infectious disease ROS     Malignancy:       Other:            Physical Exam    Airway        Mallampati: II   TM distance: > 3 FB   Neck ROM: full   Mouth opening: > 3 cm    Respiratory Devices and Support         Dental  no notable dental history         Cardiovascular   cardiovascular exam normal          Pulmonary   pulmonary exam normal                OUTSIDE LABS:  CBC: No results found for: WBC, HGB, HCT, PLT  BMP: No results found for: NA, POTASSIUM, CHLORIDE, CO2, BUN, CR, GLC  COAGS: No results found for: PTT, INR,  FIBR  POC: No results found for: BGM, HCG, HCGS  HEPATIC: No results found for: ALBUMIN, PROTTOTAL, ALT, AST, GGT, ALKPHOS, BILITOTAL, BILIDIRECT, PILAR  OTHER: No results found for: PH, LACT, A1C, MCKENNA, PHOS, MAG, LIPASE, AMYLASE, TSH, T4, T3, CRP, SED    Anesthesia Plan    ASA Status:  2   NPO Status:  NPO Appropriate    Anesthesia Type: General.     - Airway: LMA   Induction: Intravenous.   Maintenance: Balanced.        Consents    Anesthesia Plan(s) and associated risks, benefits, and realistic alternatives discussed. Questions answered and patient/representative(s) expressed understanding.    - Discussed:     - Discussed with:  Patient      - Extended Intubation/Ventilatory Support Discussed: No.      - Patient is DNR/DNI Status: No    Use of blood products discussed: No .     Postoperative Care    Pain management: IV analgesics, Oral pain medications, Multi-modal analgesia, Peripheral nerve block (Single Shot).   PONV prophylaxis: Dexamethasone or Solumedrol, Ondansetron (or other 5HT-3)     Comments:    Other Comments: The surgeon has given a verbal order transferring care of this patient to me for the performance of a regional analgesia block for post-op pain control. It is requested of me because I am uniquely trained and qualified to perform this block and the surgeon is neither trained nor qualified to perform this procedure.    Ultrasound guided peripheral nerve block(s) discussed. The patient was informed that undergoing the block is not required for surgery to proceed and is optional, but they can be beneficial in reducing post operative pain medication requirements for a period of time (several hours to a few days). Block rationale, procedure, expected results, and block specific side effects reviewed with the patient. Risks, including but not limited to bleeding, infection, nerve damage/damage to surrounding structures, medication adverse/allergic reactions, and block failure discussed.  Questions  encouraged and answered.  The patient wishes to proceed.               Chito Sagastume MD

## 2022-03-10 NOTE — OP NOTE
Procedure Date: 03/10/2022    PREOPERATIVE DIAGNOSIS:  Displaced distal radius fracture, right.    POSTOPERATIVE DIAGNOSIS:  Displaced distal radius fracture, right.    NAME OF PROCEDURE:  Open reduction and internal fixation of right distal radius fracture with Synthes volar plate system.    SURGEON:  Jose L Castorena MD.    FIRST ASSISTANT:  Syed Reyna PA-C.    ANESTHESIA:  Regional block with MAC.    INDICATIONS FOR PROCEDURE:  Ms. Velez is currently a 46-year-old female who sustained injury on 02/26/2022 after a fall while she was snowboarding.  She went through an emergency room evaluation where a closed reduction of the fracture was attempted.  Even with a closed reduction in the Emergency Room, the alignment was not quite satisfactory.  There was significant shortening and displacement as well as dorsal tilt.  We did not feel that this would maintain a satisfactory alignment while she is healing.  For that reason, we talked about the surgical intervention as an option, which included closed reduction and pinning versus open reduction and internal fixation.    With understanding of our discussion, she decided to go ahead with open reduction and internal fixation, using a volar plate system.  The nature of the surgery and potential complications related to surgery were thoroughly informed.    DESCRIPTION OF PROCEDURE:  With a satisfactory regional block in supine position, right upper extremity was prepped and draped in routine sterile fashion.  Tourniquet was inflated to 250 mmHg throughout the procedure.    Using the volar approach to the wrist, distal radius was exposed.  The peritenon as well as the pronator quadratus that was exposed at the time of the injury was closed in anatomic position after fixation was completed.    The fracture site was exposed and organized hematoma was removed.  Irrigation was made.    Under direct visualization, open reduction was performed and this was noted to be close to  anatomic.    We chose a small distal radius volar plate.  We placed the 2 distal screws to bring the plate down to the distal radius just below the articular surface and then subsequently we performed the fixation of the proximal portion of the plate with cortical screws.  Once this was completed, then we placed additional locking pegs, a total of 4, in addition to 2 screws that were initially placed on the distal portion of the plate.    Once the internal fixation was completed, the x-rays were checked intraoperatively using the C-arm image intensifier and confirmed satisfactory reduction of the fracture site as well as fixation.  The screws were appropriate length, not penetrating the dorsal cortex.    After further copious irrigation, as mentioned previously, the soft tissue was closed in layers in anatomic fashion.  Final skin closure was done with staples.  Soft dressing was applied and volar dorsal center splint was applied.  The patient was then taken to the recovery room in stable condition without any intraoperative complications.    The needle count and sponge counts were correct at the end of the case.    Blood loss was less than 2 mL.    Asa B. MD Kell        D: 03/10/2022   T: 03/10/2022   MT: [id number]    Name:     DORIS DALTON  MRN:      [id number]        Account:        [id number]   :      1975           Procedure Date: 03/10/2022     Document: [id number]

## 2022-03-10 NOTE — DISCHARGE INSTRUCTIONS
DR. WAQAS MARTINEZ M.D.      CLINIC PHONE NUMBER:  [phone] option 4.  [facility]    POST OP HAND SURGERY INSTRUCTION  Dressing: Leave the splint on and dry until follow up.    Use of the hand: OK to use the hand for light activities with lifting limitation of one pound for a week. After that, as tolerated.    Finger movement of full bending and straightening should be done frequently (as an example, 10 times every 1/2 hour).  If bandage is on the finger itself, just move the other fingers    Elevation of the hand above the heart level is recommended especially for first 3 days as much as possible.    Medication: Most of times, taking ibuprofen/Naproxen (Advil/Aleve) or Tylenol  Will be sufficient to control the pain. If pain is not controlled with non-narcotic over-the-counter medication(s),  use the prescribed pain medication (PERCOCET)  along with Ibuprofen/Naproxen. But it should be weaned off as soon as possible.    Driving: No particular restrictions are necessary. Use your own judgement.     Follow up: Post op visit should have been set up already. If you are not sure about the date, please call my office at [phone] or [phone] for scheduling  It is usually 10 to 14 days from the day of surgery.          GENERAL ANESTHESIA OR SEDATION ADULT DISCHARGE INSTRUCTIONS   SPECIAL PRECAUTIONS FOR 24 HOURS AFTER SURGERY    IT IS NOT UNUSUAL TO FEEL LIGHT-HEADED OR FAINT, UP TO 24 HOURS AFTER SURGERY OR WHILE TAKING PAIN MEDICATION.  IF YOU HAVE THESE SYMPTOMS; SIT FOR A FEW MINUTES BEFORE STANDING AND HAVE SOMEONE ASSIST YOU WHEN YOU GET UP TO WALK OR USE THE BATHROOM.    YOU SHOULD REST AND RELAX FOR THE NEXT 24 HOURS AND YOU MUST MAKE ARRANGEMENTS TO HAVE SOMEONE STAY WITH YOU FOR AT LEAST 24 HOURS AFTER YOUR DISCHARGE.  AVOID HAZARDOUS AND STRENUOUS ACTIVITIES.  DO NOT MAKE IMPORTANT DECISIONS FOR 24 HOURS.    DO NOT DRIVE ANY VEHICLE OR OPERATE MECHANICAL EQUIPMENT FOR 24 HOURS  FOLLOWING THE END OF YOUR SURGERY.  EVEN THOUGH YOU MAY FEEL NORMAL, YOUR REACTIONS MAY BE AFFECTED BY THE MEDICATION YOU HAVE RECEIVED.    DO NOT DRINK ALCOHOLIC BEVERAGES FOR 24 HOURS FOLLOWING YOUR SURGERY.    DRINK CLEAR LIQUIDS (APPLE JUICE, GINGER ALE, 7-UP, BROTH, ETC.).  PROGRESS TO YOUR REGULAR DIET AS YOU FEEL ABLE.    YOU MAY HAVE A DRY MOUTH, A SORE THROAT, MUSCLES ACHES OR TROUBLE SLEEPING.  THESE SHOULD GO AWAY AFTER 24 HOURS.    CALL YOUR DOCTOR FOR ANY OF THE FOLLOWING:  SIGNS OF INFECTION (FEVER, GROWING TENDERNESS AT THE SURGERY SITE, A LARGE AMOUNT OF DRAINAGE OR BLEEDING, SEVERE PAIN, FOUL-SMELLING DRAINAGE, REDNESS OR SWELLING.    IT HAS BEEN OVER 8 TO 10 HOURS SINCE SURGERY AND YOU ARE STILL NOT ABLE TO URINATE (PASS WATER).     Maximum acetaminophen (Tylenol) dose from all sources should not exceed 4 grams (4000 mg) per day.  You received Tylenol 975 mg at 10:30 AM.      You received Celebrex, a nonsteroidal anti-inflammatory drug (NSAID) at 9:30 AM.  Do not take any ibuprofen products until after 5:30 PM.

## 2022-03-10 NOTE — BRIEF OP NOTE
[facility]    Brief Operative Note    Pre-operative diagnosis: Closed Colles' fracture of right radius, initial encounter [M82.776H]  Post-operative diagnosis Same as pre-operative diagnosis    Procedure: Procedure(s):  Open reduction internal fixation right wrist fracture  Surgeon: Surgeon(s) and Role:     * Jose L Castorena MD - Primary     * Deyvi Reyna PA-C - Assisting  Anesthesia: General   Estimated Blood Loss: Minimal    Drains: None  Specimens: * No specimens in log *  Findings:   None.  Complications: None.  Implants:   Implant Name Type Inv. Item Serial No.  Lot No. LRB No. Used Action   2.4 mm Volar Distal Radius Plate right 6 head hole 3 shaft hole 54 mm    [employer] LOAD [id number] [id number] 2022 Right 1 Implanted   2.4 mm locking screw, 18 mm    [employer] LOAD [id number] 24 FEB 2022 Right 1 Implanted

## 2022-03-10 NOTE — ANESTHESIA CARE TRANSFER NOTE
Patient: Liset Velez    Procedure: Procedure(s):  Open reduction internal fixation right wrist fracture       Diagnosis: Closed Colles' fracture of right radius, initial encounter [S52.648Q]  Diagnosis Additional Information: No value filed.    Anesthesia Type:   General     Note:    Oropharynx: oropharynx clear of all foreign objects  Level of Consciousness: awake  Oxygen Supplementation: room air    Independent Airway: airway patency satisfactory and stable  Dentition: dentition unchanged  Vital Signs Stable: post-procedure vital signs reviewed and stable  Report to RN Given: handoff report given  Patient transferred to: Phase II    Handoff Report: Identifed the Patient, Identified the Reponsible Provider, Reviewed the pertinent medical history, Discussed the surgical course, Reviewed Intra-OP anesthesia mangement and issues during anesthesia, Set expectations for post-procedure period and Allowed opportunity for questions and acknowledgement of understanding      Vitals:  Vitals Value Taken Time   BP     Temp     Pulse     Resp     SpO2         Electronically Signed By: Dean Dennis Severson, APRN CRNA  March 10, 2022  12:39 PM

## 2022-03-10 NOTE — ANESTHESIA PROCEDURE NOTES
Brachial plexus Procedure Note    Pre-Procedure   Staff -        Anesthesiologist:  Chito Sagastume MD       Performed By: anesthesiologist       Referred By: marshall       Location: pre-op       Pre-Anesthestic Checklist: patient identified, IV checked, site marked, risks and benefits discussed, informed consent, monitors and equipment checked, pre-op evaluation and at physician/surgeon's request  Timeout:       Correct Patient: Yes        Correct Procedure: Yes        Correct Site: Yes        Correct Position: Yes        Correct Laterality: Yes        Site Marked: Yes  Procedure Documentation  Procedure: Brachial plexus       Laterality: right       Patient Position: supine       Patient Prep/Sterile Barriers: sterile gloves, mask       Skin prep: Chloraprep      Local skin infiltrated with 3 mL of.  (supraclavicular approach).       Needle Type: insulated and short bevel       Needle Gauge: 21.        Needle Length (Inches): 4        Ultrasound guided       1. Ultrasound was used to identify targeted nerve, plexus, vascular marker, or fascial plane and place a needle adjacent to it in real-time.       2. Ultrasound was used to visualize the spread of anesthetic in close proximity to the above referenced structure.       4. The visualized anatomic structures appeared normal.       5. There were no apparent abnormal pathologic findings.    Assessment/Narrative         The placement was negative for: blood aspirated, painful injection and site bleeding       Paresthesias: No.      Bolus given via needle. No blood aspirated via catheter.        Secured via.        Insertion/Infusion Method: Single Shot       Complications: none       Injection made incrementally with aspirations every 5 mL.     Comments:  Good LA spread around BP, including corner pocket

## 2022-03-11 ENCOUNTER — TELEPHONE (OUTPATIENT)
Dept: ORTHOPEDICS | Facility: CLINIC | Age: 47
End: 2022-03-11
Payer: COMMERCIAL

## 2022-03-11 NOTE — TELEPHONE ENCOUNTER
Spoke with patient.  Doing well, no questions at this time.  Offered number for nurse triage and confirmed follow up appointment.    Deyvi Reyna PA-C  [facility] - Surgery

## 2022-03-14 ENCOUNTER — TELEPHONE (OUTPATIENT)
Dept: ORTHOPEDICS | Facility: CLINIC | Age: 47
End: 2022-03-14
Payer: COMMERCIAL

## 2022-03-14 ENCOUNTER — MYC MEDICAL ADVICE (OUTPATIENT)
Dept: ORTHOPEDICS | Facility: CLINIC | Age: 47
End: 2022-03-14
Payer: COMMERCIAL

## 2022-03-14 NOTE — LETTER
March 14, 2022      Liset Velez  [address] [facility] [address]        To Whom It May Concern:    Liset Velez is under my professional care s/p right wrist surgery. She may return to work with the following:   A) Limited to light duty - lifting no greater than 0 pounds using the right upper extremity.  B) Recommend office work, allowing for breaks to elevate the extremity as needed.   C) Must keep splint clean, dry, and intact.       Restrictions in place until next office visit, dated 3/21/22.     Sincerely,        Deyvi Reyna PA-C

## 2022-03-14 NOTE — TELEPHONE ENCOUNTER
Reason for call:  Message from Liset regarding post surgery last Thursday. Requests a new work note.  Please call back.    Phone number to reach patient:  Other phone number:  [phone]* work#    Best Time:  -    Can we leave a detailed message on this number?  NO

## 2022-03-14 NOTE — TELEPHONE ENCOUNTER
Letter signed and left with triage.    Deyvi Reyna PA-C  [facility] - Surgery

## 2022-03-14 NOTE — TELEPHONE ENCOUNTER
Return to work note pending.   Please review and send.     Route to [facility]  Ortho pool to notify patient.       Pepper Gutierrez, ATC

## 2022-03-14 NOTE — TELEPHONE ENCOUNTER
Before Your Surgery      Call your surgeon if there is any change in your health. This includes signs of a cold or flu (such as a sore throat, runny nose, cough, rash or fever).    Do not smoke, drink alcohol or take over the counter medicine (unless your surgeon or primary care doctor tells you to) for the 24 hours before and after surgery.    If you take prescribed drugs: Follow your doctor s orders about which medicines to take and which to stop until after surgery.    Eating and drinking prior to surgery: follow the instructions from your surgeon    Take a shower or bath the night before surgery. Use the soap your surgeon gave you to gently clean your skin. If you do not have soap from your surgeon, use your regular soap. Do not shave or scrub the surgery site.  Wear clean pajamas and have clean sheets on your bed.     Before Your Surgery      Call your surgeon if there is any change in your health. This includes signs of a cold or flu (such as a sore throat, runny nose, cough, rash or fever).    Do not smoke, drink alcohol or take over the counter medicine (unless your surgeon or primary care doctor tells you to) for the 24 hours before and after surgery.    If you take prescribed drugs: Follow your doctor s orders about which medicines to take and which to stop until after surgery.    Eating and drinking prior to surgery: follow the instructions from your surgeon    Take a shower or bath the night before surgery. Use the soap your surgeon gave you to gently clean your skin. If you do not have soap from your surgeon, use your regular soap. Do not shave or scrub the surgery site.  Wear clean pajamas and have clean sheets on your bed.     Before Your Surgery      Call your surgeon if there is any change in your health. This includes signs of a cold or flu (such as a sore throat, runny nose, cough, rash or fever).    Do not smoke, drink alcohol or take over the counter medicine (unless your surgeon or primary  Patient obtained letter via [facility] [facility] [employer] [facility].     Sent receipt of confirmation in [employer] [facility] Message.     Closing encounter.     Pepper Gutierrez, ATC     care doctor tells you to) for the 24 hours before and after surgery.    If you take prescribed drugs: Follow your doctor s orders about which medicines to take and which to stop until after surgery.    Eating and drinking prior to surgery: follow the instructions from your surgeon    Take a shower or bath the night before surgery. Use the soap your surgeon gave you to gently clean your skin. If you do not have soap from your surgeon, use your regular soap. Do not shave or scrub the surgery site.  Wear clean pajamas and have clean sheets on your bed.     Before Your Surgery      Call your surgeon if there is any change in your health. This includes signs of a cold or flu (such as a sore throat, runny nose, cough, rash or fever).    Do not smoke, drink alcohol or take over the counter medicine (unless your surgeon or primary care doctor tells you to) for the 24 hours before and after surgery.    If you take prescribed drugs: Follow your doctor s orders about which medicines to take and which to stop until after surgery.    Eating and drinking prior to surgery: follow the instructions from your surgeon    Take a shower or bath the night before surgery. Use the soap your surgeon gave you to gently clean your skin. If you do not have soap from your surgeon, use your regular soap. Do not shave or scrub the surgery site.  Wear clean pajamas and have clean sheets on your bed.     Before Your Surgery      Call your surgeon if there is any change in your health. This includes signs of a cold or flu (such as a sore throat, runny nose, cough, rash or fever).    Do not smoke, drink alcohol or take over the counter medicine (unless your surgeon or primary care doctor tells you to) for the 24 hours before and after surgery.    If you take prescribed drugs: Follow your doctor s orders about which medicines to take and which to stop until after surgery.    Eating and drinking prior to surgery: follow the instructions from your  surgeon    Take a shower or bath the night before surgery. Use the soap your surgeon gave you to gently clean your skin. If you do not have soap from your surgeon, use your regular soap. Do not shave or scrub the surgery site.  Wear clean pajamas and have clean sheets on your bed.     Before Your Surgery      Call your surgeon if there is any change in your health. This includes signs of a cold or flu (such as a sore throat, runny nose, cough, rash or fever).    Do not smoke, drink alcohol or take over the counter medicine (unless your surgeon or primary care doctor tells you to) for the 24 hours before and after surgery.    If you take prescribed drugs: Follow your doctor s orders about which medicines to take and which to stop until after surgery.    Eating and drinking prior to surgery: follow the instructions from your surgeon    Take a shower or bath the night before surgery. Use the soap your surgeon gave you to gently clean your skin. If you do not have soap from your surgeon, use your regular soap. Do not shave or scrub the surgery site.  Wear clean pajamas and have clean sheets on your bed.

## 2022-03-21 ENCOUNTER — OFFICE VISIT (OUTPATIENT)
Dept: ORTHOPEDICS | Facility: CLINIC | Age: 47
End: 2022-03-21
Payer: COMMERCIAL

## 2022-03-21 VITALS
SYSTOLIC BLOOD PRESSURE: 126 MMHG | WEIGHT: 134 LBS | BODY MASS INDEX: 22.33 KG/M2 | DIASTOLIC BLOOD PRESSURE: 76 MMHG | HEIGHT: 65 IN

## 2022-03-21 DIAGNOSIS — Z09 POSTOP CHECK: ICD-10-CM

## 2022-03-21 DIAGNOSIS — S52.531D CLOSED COLLES' FRACTURE OF RIGHT RADIUS WITH ROUTINE HEALING, SUBSEQUENT ENCOUNTER: Primary | ICD-10-CM

## 2022-03-21 PROCEDURE — 99207 PR FRACTURE CARE IN GLOBAL PERIOD: CPT | Performed by: ORTHOPAEDIC SURGERY

## 2022-03-21 NOTE — LETTER
3/21/2022         RE: Liset Velez  [address] [facility] [address]        Dear Colleague,    Thank you for referring your patient, Liset Velez, to the [facility] [place]. Please see a copy of my visit note below.    Subjective:  Liset Velez is a 46 year old female who is seen in f/u up 2 weeks s/p Open reduction and internal fixation of right distal radius fracture with Synthes volar plate system, DOS 3/10/22.       Objective:   Not in acute distress  Right volar incision is healing well  Finger range of motion is near full limited wrist range of motion mostly in the volar flexion        Imaging studies:   None taken today        Assessment:   Status post open reduction and internal fixation of distal radius fracture, right, doing well overall        Plan:   Extremity fitted to a removable splint until we see her back in 1 month with new x-rays.  In the meantime, frequent wrist range of motion exercises throughout the day should be done and specific range of motion exercises were demonstrated.        Jose L Castorena MD  Dept. Orthopedic Surgery  [facility]       Disclaimer: This note consists of symbols derived from keyboarding, dictation and/or voice recognition software. As a result, there may be errors in the script that have gone undetected. Please consider this when interpreting information found in this chart.        Again, thank you for allowing me to participate in the care of your patient.        Sincerely,        Jose L Castorena MD

## 2022-03-21 NOTE — PATIENT INSTRUCTIONS
Work letter provided    Follow up with Syed Reyna PA-C in 4 weeks.    Call my office with any questions or concerns, [phone].

## 2022-03-21 NOTE — PROGRESS NOTES
Subjective:  Liset Velez is a 46 year old female who is seen in f/u up 2 weeks s/p Open reduction and internal fixation of right distal radius fracture with Synthes volar plate system, DOS 3/10/22.       Objective:   Not in acute distress  Right volar incision is healing well  Finger range of motion is near full limited wrist range of motion mostly in the volar flexion        Imaging studies:   None taken today        Assessment:   Status post open reduction and internal fixation of distal radius fracture, right, doing well overall        Plan:   Extremity fitted to a removable splint until we see her back in 1 month with new x-rays.  In the meantime, frequent wrist range of motion exercises throughout the day should be done and specific range of motion exercises were demonstrated.        Jose L Castorena MD  Dept. Orthopedic Surgery  [facility]       Disclaimer: This note consists of symbols derived from keyboarding, dictation and/or voice recognition software. As a result, there may be errors in the script that have gone undetected. Please consider this when interpreting information found in this chart.

## 2022-03-21 NOTE — LETTER
March 21, 2022      Liset Velez  [address] [place] [id number]        To Whom It May Concern:    Liset Velez was seen in our clinic on 3/21/22. She may return to work with the following: limited to light duty - no pushing, pulling or twisting with right hand, okay to push and pull with left hand only, and able to do fine motor activities with both hands. She will follow up in 4 weeks for reevaluation and reassessment of these work restrictions.      Sincerely,        Jose L Castorena MD

## 2022-03-21 NOTE — LETTER
March 21, 2022      Liset Velez  [address] [place] [id number]        To Whom It May Concern:    Liset Velez was seen in our clinic on 3/21/22. She may return to work with the following: limited to light duty - no pushing, pulling or twisting with right hand, okay to push and pull with left hand only, and able to do fine motor activities with both hands. She will follow up in 4 weeks for reevaluation and reassessment of these work restrictions.      Sincerely,        Jose L Castorena MD

## 2022-03-25 ENCOUNTER — TELEPHONE (OUTPATIENT)
Dept: ORTHOPEDICS | Facility: CLINIC | Age: 47
End: 2022-03-25
Payer: COMMERCIAL

## 2022-03-25 NOTE — TELEPHONE ENCOUNTER
Reason for call:  Message from Liset regarding work note revision request. Says she needs to get back to work; needs the money.  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    Please call back.    Phone number to reach patient:  Cell number on file:    Telephone Information:   Mobile [phone]       Best Time:  -    Can we leave a detailed message on this number?  NO

## 2022-04-03 ENCOUNTER — HEALTH MAINTENANCE LETTER (OUTPATIENT)
Age: 47
End: 2022-04-03

## 2022-04-04 ENCOUNTER — MYC MEDICAL ADVICE (OUTPATIENT)
Dept: ORTHOPEDICS | Facility: CLINIC | Age: 47
End: 2022-04-04
Payer: COMMERCIAL

## 2022-04-04 DIAGNOSIS — S52.531D CLOSED COLLES' FRACTURE OF RIGHT RADIUS WITH ROUTINE HEALING, SUBSEQUENT ENCOUNTER: Primary | ICD-10-CM

## 2022-04-04 NOTE — TELEPHONE ENCOUNTER
Please see MyChart and advise on physical therapy and questions on exercises.   Alondra Mckeon MS ATC

## 2022-04-05 NOTE — TELEPHONE ENCOUNTER
Patient would like occupational therapy orders sent to [facility], [facility], in [place], [place].     Phone call to [facility] to obtain fax number: [phone].     Please see order pending completion.      XIAO Cazares RN

## 2022-04-06 NOTE — TELEPHONE ENCOUNTER
Occupational Therapy orders faxed to [phone]..   Confirmed via RightFax.    Notified patient through MyChart.     Pepper Gutierrez ATC

## 2022-04-21 ENCOUNTER — OFFICE VISIT (OUTPATIENT)
Dept: ORTHOPEDICS | Facility: CLINIC | Age: 47
End: 2022-04-21

## 2022-04-21 ENCOUNTER — ANCILLARY PROCEDURE (OUTPATIENT)
Dept: GENERAL RADIOLOGY | Facility: CLINIC | Age: 47
End: 2022-04-21
Attending: PHYSICIAN ASSISTANT
Payer: COMMERCIAL

## 2022-04-21 DIAGNOSIS — Z09 POSTOP CHECK: ICD-10-CM

## 2022-04-21 DIAGNOSIS — S52.531D CLOSED COLLES' FRACTURE OF RIGHT RADIUS WITH ROUTINE HEALING, SUBSEQUENT ENCOUNTER: ICD-10-CM

## 2022-04-21 DIAGNOSIS — Z09 POSTOP CHECK: Primary | ICD-10-CM

## 2022-04-21 PROCEDURE — 73110 X-RAY EXAM OF WRIST: CPT | Mod: TC | Performed by: ORTHOPAEDIC SURGERY

## 2022-04-21 PROCEDURE — 99024 POSTOP FOLLOW-UP VISIT: CPT | Performed by: PHYSICIAN ASSISTANT

## 2022-04-21 NOTE — PROGRESS NOTES
HISTORY OF PRESENT ILLNESS:    Liset Velez is a 46 year old female who is seen in follow up for s/p Open reduction and internal fixation of right distal radius fracture with Synthes volar plate system, DOS 3/10/22.   Present symptoms: Patient reports no pain in the wrist, she reports stiffness and limited wrist extension. She reports difficulties making closed fist.  She reports increased sensitivity of the hand and fingers on the right hand.   Denies Chest pain, Calve pain, Fever, Chills.    Current Treatment: Brace and hand therapy.    PHYSICAL EXAM:  There were no vitals taken for this visit.  There is no height or weight on file to calculate BMI.   GENERAL APPEARANCE: healthy, alert and no distress   PSYCH:  mentation appears normal and affect normal/bright    MSK:  Right: Wrist, presents in cock up brace..  Ambulates: With normal gait.  Incision clean and dry, mildly hypertrophic scar present, healing.  Appropriate incisional erythema.   No Ecchymosis.  Edema mild to moderate at hand and digits and wrist.  CMS: sreekanth incisional numbness, otherwise grossly intact.  AROM wrist dorsiflexion severely limited, wrist palmar flexion 45.      IMAGING INTERPRETATION: X-ray of right wrist 3 views    Recent Results (from the past 24 hour(s))   XR Wrist Right G/E 3 Views    Narrative    History: Follow-up of open reduction and internal fixation of distal   radius fracture, March 10, 2022  Comparison: Intraoperative x-rays, March 10, 2022  Impression: Right wrist x-rays demonstrate well-maintained fracture   alignment and hardware on the volar aspect of the wrist.  There is no   evidence of hardware failure.  No other acute interval findings noted.     Dr. Jose L Castorena MD  4/21/2022  .     ASSESSMENT:  Liset Velez is a 46 year old female S/P Right wrist ORIF, DOS 3/10/2022, Dr. Castorena..    Healing stable fracture, functionally restricted as expected.    PLAN:  - Surgery discussed, images reviewed if applicable, and all  questions were answered at this time.  - Ccare instructions given and verbally acknowledged.  - Medications: None per Ortho  - Hand therapy: continue with current hand therapy  -Taper brace.  -RTW letter.  Lifting restrictions to 25 pounds for 4 to 6 weeks.    Return to clinic 6 weeks if needed.    Deyvi Reyna PA-C    Dept. Orthopedic Surgery  [facility]   4/21/2022

## 2022-04-21 NOTE — PATIENT INSTRUCTIONS
Ok to gradually taper brace.    Try to use the hand more, start with light activities such as typing and eating, without the brace.    Continue with hand therapy progressing as tolerated.    Follow-up in 6 weeks if needed.

## 2022-04-21 NOTE — LETTER
[facility] [place]  [address]  [address]  [place] [address]  [phone]          April 21, 2022    RE:  Liset Velez                                                                                                                                                       [address] [place] [id number]      To whom it may concern:    Liset Velez is under my professional care for right hand surgery.  She may return to work with the following as of 04/21/22:    E) Lift, carry, push, and pull no more than:  25 lbs.     These restrictions apply for 6 weeks.    Sincerely,        Deyvi Reyna PA-C

## 2022-10-03 ENCOUNTER — HEALTH MAINTENANCE LETTER (OUTPATIENT)
Age: 47
End: 2022-10-03

## 2023-05-21 ENCOUNTER — HEALTH MAINTENANCE LETTER (OUTPATIENT)
Age: 48
End: 2023-05-21

## 2024-07-28 ENCOUNTER — HEALTH MAINTENANCE LETTER (OUTPATIENT)
Age: 49
End: 2024-07-28

## (undated) DEVICE — PACK HAND SOP32HARMO

## (undated) DEVICE — SU VICRYL 2-0 CT-2 27" UND J269H

## (undated) DEVICE — CAST PLASTER SPLINT 4X15" EXTRA FAST

## (undated) DEVICE — CAST BUCKET

## (undated) DEVICE — LINEN HALF SHEET 5512

## (undated) DEVICE — CAST PADDING 2" UNSTERILE 9062

## (undated) DEVICE — PREP POVIDONE IODINE SOLUTION 10% 4OZ BOTTLE 29906-004

## (undated) DEVICE — GLOVE PROTEXIS BLUE W/NEU-THERA 8.0  2D73EB80

## (undated) DEVICE — SOL WATER IRRIG 1000ML BOTTLE 2F7114

## (undated) DEVICE — Device

## (undated) DEVICE — DRAPE STERI TOWEL LG 1010

## (undated) DEVICE — CAST PADDING 3" UNSTERILE 9043

## (undated) DEVICE — ESU PENCIL W/SMOKE EVAC CVPLP2000

## (undated) DEVICE — ESU GROUND PAD ADULT W/CORD E7507

## (undated) DEVICE — GLOVE PROTEXIS POWDER FREE 7.5 ORTHOPEDIC 2D73ET75

## (undated) DEVICE — STPL SKIN 35W 6.9MM  PXW35

## (undated) DEVICE — SUCTION CANISTER MEDIVAC LINER 3000ML W/LID 65651-530

## (undated) DEVICE — BNDG ELASTIC 2"X5YDS UNSTERILE 6611-20

## (undated) DEVICE — SOL NACL 0.9% IRRIG 1000ML BOTTLE 2F7124

## (undated) DEVICE — LINEN FULL SHEET 5511

## (undated) DEVICE — SU VICRYL 3-0 SH 27" J316H

## (undated) DEVICE — ESU PENCIL W/HOLSTER E2350H

## (undated) DEVICE — BAG CLEAR TRASH 1.3M 39X33" P4040C

## (undated) DEVICE — PREP CHLORAPREP 26ML TINTED HI-LITE ORANGE 930815

## (undated) DEVICE — DRILL BIT MINI 110MM QC 2.0MM  310.19

## (undated) DEVICE — TOURNIQUET SGL BLADDER 18"X4" RED 5921-218-135

## (undated) DEVICE — DRILL BIT 1.8MM  310.509

## (undated) DEVICE — DRAPE C-ARM 60X42" 1013

## (undated) DEVICE — SLING ARM MED 79-99155

## (undated) RX ORDER — TRANEXAMIC ACID 650 MG/1
TABLET ORAL
Status: DISPENSED
Start: 2022-03-10

## (undated) RX ORDER — CELECOXIB 200 MG/1
CAPSULE ORAL
Status: DISPENSED
Start: 2022-03-10

## (undated) RX ORDER — LIDOCAINE HYDROCHLORIDE 10 MG/ML
INJECTION, SOLUTION EPIDURAL; INFILTRATION; INTRACAUDAL; PERINEURAL
Status: DISPENSED
Start: 2022-03-10

## (undated) RX ORDER — PROPOFOL 10 MG/ML
INJECTION, EMULSION INTRAVENOUS
Status: DISPENSED
Start: 2022-03-10

## (undated) RX ORDER — ACETAMINOPHEN 325 MG/1
TABLET ORAL
Status: DISPENSED
Start: 2022-03-10

## (undated) RX ORDER — FENTANYL CITRATE 50 UG/ML
INJECTION, SOLUTION INTRAMUSCULAR; INTRAVENOUS
Status: DISPENSED
Start: 2022-03-10

## (undated) RX ORDER — DEXAMETHASONE SODIUM PHOSPHATE 4 MG/ML
INJECTION, SOLUTION INTRA-ARTICULAR; INTRALESIONAL; INTRAMUSCULAR; INTRAVENOUS; SOFT TISSUE
Status: DISPENSED
Start: 2022-03-10

## (undated) RX ORDER — CEFAZOLIN SODIUM/WATER 2 G/20 ML
SYRINGE (ML) INTRAVENOUS
Status: DISPENSED
Start: 2022-03-10

## (undated) RX ORDER — LIDOCAINE HYDROCHLORIDE AND EPINEPHRINE 10; 10 MG/ML; UG/ML
INJECTION, SOLUTION INFILTRATION; PERINEURAL
Status: DISPENSED
Start: 2022-03-10

## (undated) RX ORDER — BUPIVACAINE HYDROCHLORIDE 5 MG/ML
INJECTION, SOLUTION EPIDURAL; INTRACAUDAL
Status: DISPENSED
Start: 2022-03-10